# Patient Record
Sex: MALE | Race: WHITE | NOT HISPANIC OR LATINO
[De-identification: names, ages, dates, MRNs, and addresses within clinical notes are randomized per-mention and may not be internally consistent; named-entity substitution may affect disease eponyms.]

---

## 2017-01-26 ENCOUNTER — PRIOR ORIGINAL RECORDS (OUTPATIENT)
Dept: OTHER | Age: 62
End: 2017-01-26

## 2017-02-27 ENCOUNTER — OFFICE VISIT (OUTPATIENT)
Dept: FAMILY MEDICINE CLINIC | Facility: CLINIC | Age: 62
End: 2017-02-27

## 2017-02-27 VITALS
BODY MASS INDEX: 44.1 KG/M2 | OXYGEN SATURATION: 97 % | DIASTOLIC BLOOD PRESSURE: 90 MMHG | TEMPERATURE: 99 F | WEIGHT: 315 LBS | SYSTOLIC BLOOD PRESSURE: 138 MMHG | HEIGHT: 71 IN | HEART RATE: 82 BPM | RESPIRATION RATE: 24 BRPM

## 2017-02-27 DIAGNOSIS — J40 BRONCHITIS: Primary | ICD-10-CM

## 2017-02-27 PROCEDURE — 99213 OFFICE O/P EST LOW 20 MIN: CPT | Performed by: NURSE PRACTITIONER

## 2017-02-27 RX ORDER — PREDNISONE 20 MG/1
20 TABLET ORAL 2 TIMES DAILY
Qty: 10 TABLET | Refills: 0 | Status: ON HOLD | OUTPATIENT
Start: 2017-02-27 | End: 2017-03-02

## 2017-02-27 RX ORDER — ALBUTEROL SULFATE 2.5 MG/3ML
2.5 SOLUTION RESPIRATORY (INHALATION) EVERY 6 HOURS PRN
Qty: 1 BOX | Refills: 0 | Status: SHIPPED | OUTPATIENT
Start: 2017-02-27

## 2017-02-27 RX ORDER — LEVOTHYROXINE SODIUM 0.12 MG/1
125 TABLET ORAL
Refills: 3 | COMMUNITY
Start: 2016-12-25

## 2017-02-27 NOTE — PROGRESS NOTES
CHIEF COMPLAINT:   Patient presents with:  Cough: wet, green s/s for 5 days, last Neb at [facility]      HPI:   Camden Sarmiento is a 57year old male who presents for upper respiratory symptoms for  5 days. Patient reports cough with light green colored sputum.  Sy REVIEW OF SYSTEMS:   GENERAL: normal appetite  SKIN: no rashes or abnormal skin lesions  HEENT: See HPI  LUNGS: denies shortness but + wheezing, See HPI  CARDIOVASCULAR: denies chest pain or palpitations   GI: denies N/V/C or abdominal pain  NEURO: Denies albuterol sulfate (2.5 MG/3ML) 0.083% Inhalation Nebu Soln 1 Box 0      Sig: Take 3 mL (2.5 mg total) by nebulization every 6 (six) hours as needed for Wheezing. Risks, benefits, and side effects of medication explained and discussed.     Patient · A sputum test for bacteria. This requires a sample of mucus from the lungs. · A nasal or throat swab for bacterial infection. · A chest X-ray if your healthcare provider thinks you have pneumonia.   · Tests to check for an underlying condition, such as · Avoid tobacco smoke. If you smoke, quit. Stay away from smoky places. Ask friends and family not to smoke around you, or in your home or car. · Get checked for allergies.   · Ask your provider about getting a yearly flu shot, and pneumococcal or pneumoni

## 2017-02-27 NOTE — PATIENT INSTRUCTIONS
What Is Acute Bronchitis? Acute or short-term bronchitis last for days or weeks. It occurs when the bronchial tubes (airways in the lungs) are irritated by a virus, bacteria, or allergen. This causes a cough that produces yellow or greenish mucus.   Insi · Tests to check for an underlying condition, such as allergies, asthma, or COPD. You may need to see a specialist for more lung function testing. Treating a cough  The main treatment for bronchitis is easing symptoms.  Avoiding smoke, allergens, and other · Wash your hands often. This helps reduce the chance of picking up viruses that cause colds and flu. Call your healthcare provider if:  · Symptoms worsen, or new symptoms develop. · Breathing problems worsen or  become severe.   · Symptoms don’t get bett

## 2017-03-01 ENCOUNTER — APPOINTMENT (OUTPATIENT)
Dept: GENERAL RADIOLOGY | Age: 62
DRG: 194 | End: 2017-03-01
Attending: EMERGENCY MEDICINE
Payer: COMMERCIAL

## 2017-03-01 ENCOUNTER — HOSPITAL ENCOUNTER (INPATIENT)
Facility: HOSPITAL | Age: 62
LOS: 1 days | Discharge: HOME | DRG: 194 | End: 2017-03-02
Attending: EMERGENCY MEDICINE | Admitting: HOSPITALIST
Payer: COMMERCIAL

## 2017-03-01 DIAGNOSIS — J18.9 COMMUNITY ACQUIRED PNEUMONIA: Primary | ICD-10-CM

## 2017-03-01 DIAGNOSIS — J40 BRONCHITIS: ICD-10-CM

## 2017-03-01 DIAGNOSIS — R09.02 HYPOXIA: ICD-10-CM

## 2017-03-01 DIAGNOSIS — R73.9 HYPERGLYCEMIA: ICD-10-CM

## 2017-03-01 DIAGNOSIS — J45.901 ASTHMA EXACERBATION: ICD-10-CM

## 2017-03-01 LAB
ALBUMIN SERPL-MCNC: 3.9 G/DL (ref 3.5–4.8)
ALP LIVER SERPL-CCNC: 86 U/L (ref 45–117)
ALT SERPL-CCNC: 93 U/L (ref 17–63)
APTT PPP: 29.7 SECONDS (ref 25–34)
AST SERPL-CCNC: 65 U/L (ref 15–41)
BASOPHILS # BLD AUTO: 0.02 X10(3) UL (ref 0–0.1)
BASOPHILS NFR BLD AUTO: 0.3 %
BILIRUB SERPL-MCNC: 0.5 MG/DL (ref 0.1–2)
BUN BLD-MCNC: 12 MG/DL (ref 8–20)
CALCIUM BLD-MCNC: 8.4 MG/DL (ref 8.3–10.3)
CHLORIDE: 103 MMOL/L (ref 101–111)
CO2: 27 MMOL/L (ref 22–32)
CREAT BLD-MCNC: 1.08 MG/DL (ref 0.7–1.3)
D-DIMER: 0.31 UG/ML FEU (ref 0–0.49)
EOSINOPHIL # BLD AUTO: 0.01 X10(3) UL (ref 0–0.3)
EOSINOPHIL NFR BLD AUTO: 0.1 %
ERYTHROCYTE [DISTWIDTH] IN BLOOD BY AUTOMATED COUNT: 12.9 % (ref 11.5–16)
GLUCOSE BLD-MCNC: 204 MG/DL (ref 70–99)
HCT VFR BLD AUTO: 41 % (ref 37–53)
HGB BLD-MCNC: 14.7 G/DL (ref 13–17)
IMMATURE GRANULOCYTE COUNT: 0.07 X10(3) UL (ref 0–1)
IMMATURE GRANULOCYTE RATIO %: 1 %
INR BLD: 1.02 (ref 0.89–1.12)
LYMPHOCYTES # BLD AUTO: 0.95 X10(3) UL (ref 0.9–4)
LYMPHOCYTES NFR BLD AUTO: 13 %
M PROTEIN MFR SERPL ELPH: 7.5 G/DL (ref 6.1–8.3)
MCH RBC QN AUTO: 35.1 PG (ref 27–33.2)
MCHC RBC AUTO-ENTMCNC: 35.9 G/DL (ref 31–37)
MCV RBC AUTO: 97.9 FL (ref 80–99)
MONOCYTES # BLD AUTO: 0.25 X10(3) UL (ref 0.1–0.6)
MONOCYTES NFR BLD AUTO: 3.4 %
NEUTROPHIL ABS PRELIM: 6.03 X10 (3) UL (ref 1.3–6.7)
NEUTROPHILS # BLD AUTO: 6.03 X10(3) UL (ref 1.3–6.7)
NEUTROPHILS NFR BLD AUTO: 82.2 %
PLATELET # BLD AUTO: 138 10(3)UL (ref 150–450)
POTASSIUM SERPL-SCNC: 3.6 MMOL/L (ref 3.6–5.1)
PSA SERPL DL<=0.01 NG/ML-MCNC: 13.1 SECONDS (ref 11.8–14.1)
RBC # BLD AUTO: 4.19 X10(6)UL (ref 4.3–5.7)
RED CELL DISTRIBUTION WIDTH-SD: 46.4 FL (ref 35.1–46.3)
SODIUM SERPL-SCNC: 139 MMOL/L (ref 136–144)
TROPONIN: <0.046 NG/ML (ref ?–0.05)
WBC # BLD AUTO: 7.3 X10(3) UL (ref 4–13)

## 2017-03-01 PROCEDURE — 99223 1ST HOSP IP/OBS HIGH 75: CPT | Performed by: INTERNAL MEDICINE

## 2017-03-01 PROCEDURE — 71010 XR CHEST AP PORTABLE  (CPT=71010): CPT

## 2017-03-01 RX ORDER — IPRATROPIUM BROMIDE AND ALBUTEROL SULFATE 2.5; .5 MG/3ML; MG/3ML
3 SOLUTION RESPIRATORY (INHALATION) EVERY 4 HOURS PRN
Status: DISCONTINUED | OUTPATIENT
Start: 2017-03-01 | End: 2017-03-02

## 2017-03-01 RX ORDER — CARVEDILOL 12.5 MG/1
12.5 TABLET ORAL 2 TIMES DAILY WITH MEALS
Status: DISCONTINUED | OUTPATIENT
Start: 2017-03-02 | End: 2017-03-01

## 2017-03-01 RX ORDER — GUAIFENESIN 600 MG
1200 TABLET, EXTENDED RELEASE 12 HR ORAL 2 TIMES DAILY
Status: DISCONTINUED | OUTPATIENT
Start: 2017-03-01 | End: 2017-03-02

## 2017-03-01 RX ORDER — LISINOPRIL 40 MG/1
40 TABLET ORAL DAILY
Status: DISCONTINUED | OUTPATIENT
Start: 2017-03-02 | End: 2017-03-01

## 2017-03-01 RX ORDER — ACETAMINOPHEN 325 MG/1
650 TABLET ORAL EVERY 6 HOURS PRN
Status: DISCONTINUED | OUTPATIENT
Start: 2017-03-01 | End: 2017-03-02

## 2017-03-01 RX ORDER — GUAIFENESIN 600 MG
1200 TABLET, EXTENDED RELEASE 12 HR ORAL 2 TIMES DAILY
COMMUNITY
End: 2018-02-06

## 2017-03-01 RX ORDER — HEPARIN SODIUM 5000 [USP'U]/ML
7500 INJECTION, SOLUTION INTRAVENOUS; SUBCUTANEOUS EVERY 8 HOURS
Status: DISCONTINUED | OUTPATIENT
Start: 2017-03-01 | End: 2017-03-02

## 2017-03-01 RX ORDER — ONDANSETRON 2 MG/ML
4 INJECTION INTRAMUSCULAR; INTRAVENOUS EVERY 6 HOURS PRN
Status: DISCONTINUED | OUTPATIENT
Start: 2017-03-01 | End: 2017-03-02

## 2017-03-01 RX ORDER — TRANDOLAPRIL 4 MG/1
4 TABLET ORAL DAILY
Status: DISCONTINUED | OUTPATIENT
Start: 2017-03-02 | End: 2017-03-02

## 2017-03-01 RX ORDER — VERAPAMIL HYDROCHLORIDE 240 MG/1
240 TABLET, FILM COATED, EXTENDED RELEASE ORAL DAILY
Status: DISCONTINUED | OUTPATIENT
Start: 2017-03-02 | End: 2017-03-02

## 2017-03-01 RX ORDER — CARVEDILOL PHOSPHATE 40 MG/1
40 CAPSULE, EXTENDED RELEASE ORAL DAILY
Status: DISCONTINUED | OUTPATIENT
Start: 2017-03-02 | End: 2017-03-02

## 2017-03-01 RX ORDER — METHYLPREDNISOLONE SODIUM SUCCINATE 125 MG/2ML
125 INJECTION, POWDER, LYOPHILIZED, FOR SOLUTION INTRAMUSCULAR; INTRAVENOUS ONCE
Status: COMPLETED | OUTPATIENT
Start: 2017-03-01 | End: 2017-03-01

## 2017-03-01 RX ORDER — ALLOPURINOL 300 MG/1
300 TABLET ORAL DAILY
Status: DISCONTINUED | OUTPATIENT
Start: 2017-03-02 | End: 2017-03-02

## 2017-03-01 RX ORDER — METHYLPREDNISOLONE SODIUM SUCCINATE 125 MG/2ML
125 INJECTION, POWDER, LYOPHILIZED, FOR SOLUTION INTRAMUSCULAR; INTRAVENOUS EVERY 8 HOURS
Status: DISCONTINUED | OUTPATIENT
Start: 2017-03-02 | End: 2017-03-02

## 2017-03-01 RX ORDER — LEVOTHYROXINE SODIUM 0.12 MG/1
125 TABLET ORAL
Status: DISCONTINUED | OUTPATIENT
Start: 2017-03-02 | End: 2017-03-02

## 2017-03-01 NOTE — ED INITIAL ASSESSMENT (HPI)
Pt has had sob and went to immed care on Monday dx bronchitis and now worsening sob, returned from [facility] 4 weeks ago.

## 2017-03-01 NOTE — ED PROVIDER NOTES
Patient Seen in: [facility] Emergency Department In [place]    History   Patient presents with:  Dyspnea ALEAH SOB (respiratory)    Stated Complaint: Shortness of breath, was at [facility] immediate care center with dx of bronchitis,*    HPI    Patient has a hi ergocalciferol 89493 UNITS Oral Cap,     Ascorbic Acid (VITAMIN C) 100 MG Oral Tab,  Take 100 mg by mouth daily. Cyanocobalamin (B-12) 100 MCG Oral Tab,  Take by mouth. Multiple Vitamins-Minerals (DAILY MULTI) Oral Tab,  Take by mouth.    Albuterol Sulf obese but nondistended. Completely nontender  Extremities: No extraordinary edema. Lower extremities with some chronic venous stasis changes. Skin: Psoriatic changes  Neurologic:  Mental status as above.   Patient moves all extremities with good strength Patient is having significant wheeze with significant hypoxia. Likely, he has a flareup of his asthma with superimposed bronchitis.   Pneumonia including the differential.  With his obesity and recent immobilization, pulmonary embolism must be excluded with the patient, family, and clinical staff.

## 2017-03-02 VITALS
HEART RATE: 75 BPM | BODY MASS INDEX: 44.1 KG/M2 | WEIGHT: 315 LBS | SYSTOLIC BLOOD PRESSURE: 136 MMHG | HEIGHT: 71 IN | TEMPERATURE: 98 F | DIASTOLIC BLOOD PRESSURE: 70 MMHG | RESPIRATION RATE: 20 BRPM | OXYGEN SATURATION: 98 %

## 2017-03-02 LAB
ADENOVIRUS PCR:: NEGATIVE
ALBUMIN SERPL-MCNC: 3.5 G/DL (ref 3.5–4.8)
ALP LIVER SERPL-CCNC: 81 U/L (ref 45–117)
ALT SERPL-CCNC: 80 U/L (ref 17–63)
AST SERPL-CCNC: 53 U/L (ref 15–41)
ATRIAL RATE: 85 BPM
B PERT DNA SPEC QL NAA+PROBE: NEGATIVE
BASOPHILS # BLD AUTO: 0.01 X10(3) UL (ref 0–0.1)
BASOPHILS NFR BLD AUTO: 0.2 %
BILIRUB SERPL-MCNC: 0.4 MG/DL (ref 0.1–2)
BUN BLD-MCNC: 13 MG/DL (ref 8–20)
C PNEUM DNA SPEC QL NAA+PROBE: NEGATIVE
CALCIUM BLD-MCNC: 8.9 MG/DL (ref 8.3–10.3)
CHLORIDE: 105 MMOL/L (ref 101–111)
CO2: 27 MMOL/L (ref 22–32)
CORONAVIRUS 229E PCR:: NEGATIVE
CORONAVIRUS HKU1 PCR:: NEGATIVE
CORONAVIRUS NL63 PCR:: NEGATIVE
CORONAVIRUS OC43 PCR:: NEGATIVE
CREAT BLD-MCNC: 0.92 MG/DL (ref 0.7–1.3)
EOSINOPHIL # BLD AUTO: 0 X10(3) UL (ref 0–0.3)
EOSINOPHIL NFR BLD AUTO: 0 %
ERYTHROCYTE [DISTWIDTH] IN BLOOD BY AUTOMATED COUNT: 12.8 % (ref 11.5–16)
FLUAV H1 2009 PAND RNA SPEC QL NAA+PROBE: NEGATIVE
FLUAV H1 RNA SPEC QL NAA+PROBE: NEGATIVE
FLUAV H3 RNA SPEC QL NAA+PROBE: NEGATIVE
FLUAV RNA SPEC QL NAA+PROBE: NEGATIVE
FLUBV RNA SPEC QL NAA+PROBE: NEGATIVE
GLUCOSE BLD-MCNC: 165 MG/DL (ref 70–99)
HCT VFR BLD AUTO: 40.3 % (ref 37–53)
HGB BLD-MCNC: 14.2 G/DL (ref 13–17)
IMMATURE GRANULOCYTE COUNT: 0.04 X10(3) UL (ref 0–1)
IMMATURE GRANULOCYTE RATIO %: 0.8 %
LYMPHOCYTES # BLD AUTO: 0.97 X10(3) UL (ref 0.9–4)
LYMPHOCYTES NFR BLD AUTO: 18.7 %
M PROTEIN MFR SERPL ELPH: 7.2 G/DL (ref 6.1–8.3)
MCH RBC QN AUTO: 34.4 PG (ref 27–33.2)
MCHC RBC AUTO-ENTMCNC: 35.2 G/DL (ref 31–37)
MCV RBC AUTO: 97.6 FL (ref 80–99)
METAPNEUMOVIRUS PCR:: POSITIVE
MONOCYTES # BLD AUTO: 0.07 X10(3) UL (ref 0.1–0.6)
MONOCYTES NFR BLD AUTO: 1.3 %
MYCOPLASMA PNEUMONIA PCR:: NEGATIVE
NEUTROPHIL ABS PRELIM: 4.11 X10 (3) UL (ref 1.3–6.7)
NEUTROPHILS # BLD AUTO: 4.11 X10(3) UL (ref 1.3–6.7)
NEUTROPHILS NFR BLD AUTO: 79 %
P AXIS: 56 DEGREES
P-R INTERVAL: 172 MS
PARAINFLUENZA 1 PCR:: NEGATIVE
PARAINFLUENZA 2 PCR:: NEGATIVE
PARAINFLUENZA 3 PCR:: NEGATIVE
PARAINFLUENZA 4 PCR:: NEGATIVE
PLATELET # BLD AUTO: 134 10(3)UL (ref 150–450)
POTASSIUM SERPL-SCNC: 4 MMOL/L (ref 3.6–5.1)
Q-T INTERVAL: 372 MS
QRS DURATION: 86 MS
QTC CALCULATION (BEZET): 442 MS
R AXIS: -32 DEGREES
RBC # BLD AUTO: 4.13 X10(6)UL (ref 4.3–5.7)
RED CELL DISTRIBUTION WIDTH-SD: 45.1 FL (ref 35.1–46.3)
RHINOVIRUS/ENTERO PCR:: NEGATIVE
RSV RNA SPEC QL NAA+PROBE: NEGATIVE
SODIUM SERPL-SCNC: 140 MMOL/L (ref 136–144)
T AXIS: 54 DEGREES
VENTRICULAR RATE: 85 BPM
WBC # BLD AUTO: 5.2 X10(3) UL (ref 4–13)

## 2017-03-02 PROCEDURE — 99239 HOSP IP/OBS DSCHRG MGMT >30: CPT | Performed by: INTERNAL MEDICINE

## 2017-03-02 RX ORDER — ALBUTEROL SULFATE 2.5 MG/3ML
2.5 SOLUTION RESPIRATORY (INHALATION)
Status: DISCONTINUED | OUTPATIENT
Start: 2017-03-02 | End: 2017-03-02

## 2017-03-02 RX ORDER — PREDNISONE 20 MG/1
40 TABLET ORAL
Qty: 10 TABLET | Refills: 0 | Status: SHIPPED | OUTPATIENT
Start: 2017-03-02 | End: 2017-03-07

## 2017-03-02 RX ORDER — PREDNISONE 20 MG/1
40 TABLET ORAL
Status: DISCONTINUED | OUTPATIENT
Start: 2017-03-02 | End: 2017-03-02

## 2017-03-02 RX ORDER — MOMETASONE 50 UG/1
1 SPRAY, METERED NASAL DAILY
Qty: 1 BOTTLE | Refills: 0 | Status: SHIPPED | OUTPATIENT
Start: 2017-03-02 | End: 2017-03-09

## 2017-03-02 NOTE — PROGRESS NOTES
[facility] Progress Note:  Anticoagulation Weight Dose Adjustment for heparin    Brisa Solis is a 57year old male who has been prescribed heparin 5000 units subcutaneously every 8 hours for VTE prophylaxis.       Estimated Creatinine Clearance: 75.5

## 2017-03-02 NOTE — PROGRESS NOTES
NURSING ADMISSION NOTE      Patient admitted via Cart  Oriented to room. Safety precautions initiated. Bed in low position.   Call light in reach.    ---------------------------------------------------------  Admission navigator completed  Patient is

## 2017-03-02 NOTE — PAYOR COMM NOTE
Attending Physician: Oracio Tamez MD    3/1    ED LATE    Dyspnea ALEAH SOB    Shortness of breath, was at [facility] with dx of bronchitis,*    +  history of asthma.  Last week  felt like his asthma was flaring.  His breathing was ba WORSE    ALB NEBS Q6  ROCEPHIN IV QD  ATROVENT NEBS  SOLUMEDROL IV Q8  POX  TELE  02  K REPLACED

## 2017-03-02 NOTE — H&P
[facility] HOSPITALIST  History and Physical     Debbie Jarred Patient Status:  Inpatient    1955 MRN [id number]   [facility] 3NE-A Attending Ritesh Whittington MD   Hosp Day # 0 PCP Ulises Julien MD     Chief Complaint: SOB    History of Rfl: 0   Trandolapril 4 MG Oral Tab Take 4 mg by mouth once daily. Disp:  Rfl: 2   Verapamil HCl  MG Oral Capsule SR 24 Hr Take by mouth once daily. Disp:  Rfl: 1   allopurinol 300 MG Oral Tab Take 300 mg by mouth daily.  Disp:  Rfl:    ergocalciferol 03/01/17   1751   GLU  204*   BUN  12   CREATSERUM  1.08   CA  8.4   ALB  3.9   NA  139   K  3.6   CL  103   CO2  27.0   ALKPHO  86   AST  65*   ALT  93*   BILT  0.5   TP  7.5       Estimated Creatinine Clearance: 75.5 mL/min (based on Cr of 1.08).     Rece

## 2017-03-02 NOTE — PLAN OF CARE
Pt alert and oriented. Denies pain, nausea. IV abx dc'd per hospitalist. Pt started on PO prednisone. VSS. O2 sats 95% on RA. Afebrile. Pt ambulated in hallway, tolerated well. No c/o SOB. Pt stated he was 90% at his baseline.  Pt stated he was able to walk

## 2017-03-03 NOTE — PLAN OF CARE
NURSING DISCHARGE NOTE    Discharged home via wheelchair. Accompanied by support staff. Belongings sent home with pt. Discharge instructions, f/u appointment discussed with pt. Pt informed about meds that were called into pharmacy.  Home meds (5) r

## 2017-03-03 NOTE — DISCHARGE SUMMARY
[facility] [place] HOSPITALIST  DISCHARGE SUMMARY     Janelle Gaitan Patient Status:  Inpatient    1955 MRN [id number]   [place] 3NE-A Attending Gilmer Martinez MD   [facility] Day # 1 PCP Blake Aldrich MD     Date of Admission: 3/1/2017  Date pneumonia so he was initially started on antibiotics but later his respiratory viral panel returned positive for metapneumovirus so antibiotics were discontinued. He was mildly hypoxic on admission that resolved with scheduled neb treatments.  He improved f on:  3/7/2017   Quantity:  10 tablet   Refills:  0         CONTINUE taking these medications       Instructions Prescription details    allopurinol 300 MG Tabs   Last time this was given:  300 mg on 3/2/2017  8:52 AM   Commonly known as:  Hillary RIZZO 3/10/2017  Follow up with Dr. Antonette Roldan on 3/10 at 1:30      Vital signs:  Temp:  [97.8 °F (36.6 °C)-98.6 °F (37 °C)] 98.2 °F (36.8 °C)  Pulse:  [62-79] 75  Resp:  [16-20] 20  BP: (136-175)/(70-89) 136/70 mmHg    Physical Exam:    General: No acute distress.

## 2017-05-25 ENCOUNTER — PRIOR ORIGINAL RECORDS (OUTPATIENT)
Dept: OTHER | Age: 62
End: 2017-05-25

## 2017-09-20 ENCOUNTER — PRIOR ORIGINAL RECORDS (OUTPATIENT)
Dept: OTHER | Age: 62
End: 2017-09-20

## 2018-01-24 ENCOUNTER — PRIOR ORIGINAL RECORDS (OUTPATIENT)
Dept: OTHER | Age: 63
End: 2018-01-24

## 2018-02-26 ENCOUNTER — ANESTHESIA EVENT (OUTPATIENT)
Dept: ENDOSCOPY | Facility: HOSPITAL | Age: 63
End: 2018-02-26
Payer: COMMERCIAL

## 2018-02-27 ENCOUNTER — HOSPITAL ENCOUNTER (OUTPATIENT)
Facility: HOSPITAL | Age: 63
Setting detail: HOSPITAL OUTPATIENT SURGERY
Discharge: HOME | End: 2018-02-27
Attending: SURGERY | Admitting: SURGERY
Payer: COMMERCIAL

## 2018-02-27 ENCOUNTER — ANESTHESIA (OUTPATIENT)
Dept: ENDOSCOPY | Facility: HOSPITAL | Age: 63
End: 2018-02-27
Payer: COMMERCIAL

## 2018-02-27 ENCOUNTER — SURGERY (OUTPATIENT)
Age: 63
End: 2018-02-27

## 2018-02-27 VITALS
TEMPERATURE: 99 F | RESPIRATION RATE: 16 BRPM | BODY MASS INDEX: 44.1 KG/M2 | WEIGHT: 315 LBS | HEIGHT: 71 IN | DIASTOLIC BLOOD PRESSURE: 83 MMHG | HEART RATE: 74 BPM | SYSTOLIC BLOOD PRESSURE: 152 MMHG | OXYGEN SATURATION: 94 %

## 2018-02-27 DIAGNOSIS — Z86.010 HISTORY OF COLON POLYPS: ICD-10-CM

## 2018-02-27 PROCEDURE — 0DJD8ZZ INSPECTION OF LOWER INTESTINAL TRACT, VIA NATURAL OR ARTIFICIAL OPENING ENDOSCOPIC: ICD-10-PCS | Performed by: SURGERY

## 2018-02-27 RX ORDER — MORPHINE SULFATE 2 MG/ML
2 INJECTION, SOLUTION INTRAMUSCULAR; INTRAVENOUS EVERY 5 MIN PRN
Status: DISCONTINUED | OUTPATIENT
Start: 2018-02-27 | End: 2018-02-27

## 2018-02-27 RX ORDER — SODIUM CHLORIDE, SODIUM LACTATE, POTASSIUM CHLORIDE, CALCIUM CHLORIDE 600; 310; 30; 20 MG/100ML; MG/100ML; MG/100ML; MG/100ML
INJECTION, SOLUTION INTRAVENOUS CONTINUOUS
Status: DISCONTINUED | OUTPATIENT
Start: 2018-02-27 | End: 2018-02-27

## 2018-02-27 RX ORDER — ONDANSETRON 2 MG/ML
4 INJECTION INTRAMUSCULAR; INTRAVENOUS AS NEEDED
Status: DISCONTINUED | OUTPATIENT
Start: 2018-02-27 | End: 2018-02-27

## 2018-02-27 RX ORDER — NALOXONE HYDROCHLORIDE 0.4 MG/ML
80 INJECTION, SOLUTION INTRAMUSCULAR; INTRAVENOUS; SUBCUTANEOUS AS NEEDED
Status: DISCONTINUED | OUTPATIENT
Start: 2018-02-27 | End: 2018-02-27

## 2018-02-27 NOTE — INTERVAL H&P NOTE
Pre-op Diagnosis: History of colon polyps [Z86.010]    The above referenced H&P was reviewed by Kasandra Saunders MD on 2/27/2018, the patient was examined and no significant changes have occurred in the patient's condition since the H&P was performed.   I discus

## 2018-02-27 NOTE — ANESTHESIA PREPROCEDURE EVALUATION
PRE-OP EVALUATION    Patient Name: Quita Sharif    Pre-op Diagnosis: History of colon polyps [Z86.010]    Procedure(s):  COLONOSCOPY, POSSIBLE BIOPSY, POSSIBLE POLYPECTOMY    Surgeon(s) and Role:     Mirna Gomez MD - Primary    Pre-op vitals reviewed date: SINUS SURGERY    No date: TONSILLECTOMY     Smoking status: Former Smoker  For 4.00 Years     Quit date: 2/13/1988    Smokeless tobacco: Never Used    Alcohol use Yes  3.6 oz/week    6 Cans of beer per week            Drug use: No     Available pre-o

## 2018-02-27 NOTE — ANESTHESIA POSTPROCEDURE EVALUATION
[address] Patient Status:  Hospital Outpatient Surgery   Age/Gender 60year old male MRN [id number]   Location [facility]. Attending Zhao Tadeo MD   Hosp Day # 0 PCP Eula Eisenberg MD       Anesthesia Post-op Note

## 2018-02-27 NOTE — OPERATIVE REPORT
[facility]    PATIENT'S NAME: Vitaly Stackocent   ATTENDING PHYSICIAN: Maddy Roque M.D. OPERATING PHYSICIAN: Maddy Roque M.D.    PATIENT ACCOUNT#:   [de-identified]    LOCATION:  ENDO  ENDO POOL ROOMS 8 EDWP  MEDICAL RECORD #:   [id number]       DATE OF BI

## 2018-02-27 NOTE — H&P (VIEW-ONLY)
2/6/2018    Patient presents with:  New Patient: Colonoscopy Ref: Self      HPI:    Nori Guzman is a 60year old male who presents today for colonoscopy evaluation. Patient denies any abdominal pain. no change in bowel movements. no rectal bleeding.  no (VITAMIN C) 100 MG Oral Tab Take 100 mg by mouth daily. Disp:  Rfl:    Cyanocobalamin (B-12) 100 MCG Oral Tab Take by mouth once a week. Disp:  Rfl:        No Known Allergies    History reviewed. No pertinent family history.     Smoking status: Never Smok

## 2018-02-27 NOTE — BRIEF OP NOTE
Pre-Operative Diagnosis: History of colon polyps [Z86.010]     Post-Operative Diagnosis: diverticulosis, hemorrhoids     Procedure Performed:   Procedure(s):  COLONOSCOPY, POSSIBLE BIOPSY, POSSIBLE POLYPECTOMY    Surgeon(s) and Role:     Mirna Gomez

## 2018-05-23 ENCOUNTER — PRIOR ORIGINAL RECORDS (OUTPATIENT)
Dept: OTHER | Age: 63
End: 2018-05-23

## 2018-09-27 ENCOUNTER — PRIOR ORIGINAL RECORDS (OUTPATIENT)
Dept: OTHER | Age: 63
End: 2018-09-27

## 2018-12-31 ENCOUNTER — PRIOR ORIGINAL RECORDS (OUTPATIENT)
Dept: OTHER | Age: 63
End: 2018-12-31

## 2019-01-24 ENCOUNTER — PRIOR ORIGINAL RECORDS (OUTPATIENT)
Dept: OTHER | Age: 64
End: 2019-01-24

## 2019-05-29 ENCOUNTER — PRIOR ORIGINAL RECORDS (OUTPATIENT)
Dept: OTHER | Age: 64
End: 2019-05-29

## 2019-12-31 ENCOUNTER — PRIOR ORIGINAL RECORDS (OUTPATIENT)
Dept: OTHER | Age: 64
End: 2019-12-31

## 2020-10-09 LAB
ALBUMIN/GLOB SERPL: 1.9 (CALC) (ref 1–2.5)
ALBUMIN/GLOB SERPL: 1.9 (CALC) (ref 1–2.5)
ALBUMIN/GLOB SERPL: 2 (CALC)
ALBUMIN: 4.3 G/DL
ALBUMIN: 4.5 G/DL (ref 3.6–5.1)
ALBUMIN: 4.6 G/DL (ref 3.6–5.1)
ALKALINE PHOSPHATASE: 82 UNIT/L
ALKALINE PHOSPHATASE: 82 UNIT/L (ref 40–115)
ALKALINE PHOSPHATASE: 94 UNIT/L (ref 40–115)
ALT: 20 UNIT/L
ALT: 20 UNIT/L (ref 9–46)
ALT: 20 UNIT/L (ref 9–46)
AST: 12 UNIT/L (ref 10–35)
AST: 19 UNIT/L
AST: 20 UNIT/L (ref 10–35)
BASO%: 0.1 %
BASO%: 0.2 %
BASO%: 0.3 %
BASO%: 0.4 %
BASO: 0 10^3/UL
BILIRUBIN, TOTAL: 0.2 MG/DL
BILIRUBIN, TOTAL: 0.5 MG/DL (ref 0.2–1.2)
BILIRUBIN, TOTAL: 0.6 MG/DL (ref 0.2–1.2)
BUN/CREATININE RATIO: 12 (CALC)
BUN/CREATININE RATIO: ABNORMAL (CALC) (ref 6–22)
BUN/CREATININE RATIO: NORMAL (CALC) (ref 6–22)
CALCIUM: 8.9 MG/DL
CALCIUM: 9.3 MG/DL (ref 8.6–10.3)
CALCIUM: 9.6 MG/DL (ref 8.6–10.3)
CARBON DIOXIDE: 21 MMOL/L
CARBON DIOXIDE: 24 MMOL/L (ref 20–32)
CARBON DIOXIDE: 26 MMOL/L (ref 20–32)
CHLORIDE: 102 MMOL/L (ref 98–110)
CHLORIDE: 103 MMOL/L (ref 98–110)
CHLORIDE: 104 MMOL/L
CRCL (C&G) (MOSAIQ HL): 171.53 ML/MIN
CRCL (C&G) (MOSAIQ HL): 195.25 ML/MIN
CRCL (C&G) (MOSAIQ HL): 209.88 ML/MIN
CREATININE CLEARANCE (MOSAIQ HL): 69 ML/MIN
CREATININE CLEARANCE (MOSAIQ HL): 81.6 ML/MIN
CREATININE CLEARANCE (MOSAIQ HL): 86 ML/MIN
CREATININE: 0.73 MG/DL
CREATININE: 0.77 MG/DL (ref 0.7–1.25)
CREATININE: 0.91 MG/DL (ref 0.7–1.25)
EGFR AFRICAN AMERICAN: 103 ML/MIN/1.73M2
EGFR AFRICAN AMERICAN: 111 ML/MIN/1.73M2
EGFR AFRICAN AMERICAN: 113 ML/MIN/1.73M2
EGFR NON-AFR. AMERICAN: 89 ML/MIN/1.73M2
EGFR NON-AFR. AMERICAN: 96 ML/MIN/1.73M2
EGFR NON-AFR. AMERICAN: 98 ML/MIN/1.73M2
EOS%: 1.8 %
EOS%: 2 %
EOS%: 2.2 %
EOS%: 2.2 %
EOS%: 2.4 %
EOS%: 2.5 %
EOS%: 2.6 %
EOS%: 2.6 %
EOS: 0.2 10^3/UL
EOS: 0.3 10^3/UL
GLOBULIN: 2.2 G/DL (CALC)
GLOBULIN: 2.4 G/DL (CALC) (ref 1.9–3.7)
GLOBULIN: 2.4 G/DL (CALC) (ref 1.9–3.7)
GLUCOSE: 107 MG/DL (ref 65–99)
GLUCOSE: 117 MG/DL
GLUCOSE: 84 MG/DL (ref 65–99)
HCT: 39.9 % (ref 38–54)
HCT: 39.9 % (ref 38–54)
HCT: 40.6 % (ref 38–54)
HCT: 40.7 % (ref 38–54)
HCT: 40.8 % (ref 38–54)
HCT: 41.1 % (ref 38–54)
HCT: 42.6 % (ref 38–54)
HCT: 43.1 % (ref 38–54)
HGB: 14 G/DL (ref 12–18)
HGB: 14 G/DL (ref 12–18)
HGB: 14.4 G/DL (ref 12–18)
HGB: 14.5 G/DL (ref 12–18)
HGB: 14.6 G/DL (ref 12–18)
HGB: 14.9 G/DL (ref 12–18)
LD: 148 UNIT/L (ref 120–250)
LD: 178 UNIT/L (ref 120–250)
LYMPH%: 18.4 % (ref 12–44)
LYMPH%: 19.7 % (ref 12–44)
LYMPH%: 20.5 % (ref 12–44)
LYMPH%: 22.5 % (ref 12–44)
LYMPH%: 23.4 % (ref 12–44)
LYMPH%: 23.9 % (ref 12–44)
LYMPH%: 24 % (ref 12–44)
LYMPH%: 24 % (ref 12–44)
LYMPH: 1.5 10^3/UL (ref 0.8–2.8)
LYMPH: 1.8 10^3/UL (ref 0.8–2.8)
LYMPH: 1.9 10^3/UL (ref 0.8–2.8)
LYMPH: 2 10^3/UL (ref 0.8–2.8)
LYMPH: 2.1 10^3/UL (ref 0.8–2.8)
LYMPH: 2.2 10^3/UL (ref 0.8–2.8)
MCH: 33.5 PG (ref 26–33)
MCH: 34.1 PG (ref 26–33)
MCH: 34.1 PG (ref 26–33)
MCH: 34.3 PG (ref 26–33)
MCH: 34.4 PG (ref 26–33)
MCH: 34.4 PG (ref 26–33)
MCH: 34.6 PG (ref 26–33)
MCH: 34.7 PG (ref 26–33)
MCHC: 34.3 G/DL (ref 31–36)
MCHC: 34.4 G/DL (ref 31–36)
MCHC: 34.6 G/DL (ref 31–36)
MCHC: 35.1 G/DL (ref 31–36)
MCHC: 35.3 G/DL (ref 31–36)
MCHC: 35.3 G/DL (ref 31–36)
MCHC: 35.5 G/DL (ref 31–36)
MCHC: 36.1 G/DL (ref 31–36)
MCV: 96.1 FML (ref 82–100)
MCV: 96.9 FML (ref 82–100)
MCV: 97.6 FML (ref 82–100)
MCV: 97.7 FML (ref 82–100)
MCV: 97.8 FML (ref 82–100)
MCV: 98.1 FML (ref 82–100)
MCV: 98.6 FML (ref 82–100)
MCV: 99 FML (ref 82–100)
MONO%: 6.8 % (ref 2–12)
MONO%: 7.4 % (ref 2–12)
MONO%: 7.8 % (ref 2–12)
MONO%: 8 % (ref 2–12)
MONO%: 8.1 % (ref 2–12)
MONO%: 8.3 % (ref 2–12)
MONO%: 8.3 % (ref 2–12)
MONO%: 9.2 % (ref 2–12)
MONO: 0.7 10^3/UL (ref 0.2–1)
MONO: 0.8 10^3/UL (ref 0.2–1)
MPV: 8.5 FML (ref 8.6–11.7)
MPV: 8.6 FML (ref 8.6–11.7)
MPV: 8.7 FML (ref 8.6–11.7)
MPV: 8.8 FML (ref 8.6–11.7)
MPV: 8.9 FML (ref 8.6–11.7)
MPV: 9.1 FML (ref 8.6–11.7)
NEUT%: 64 % (ref 47–76)
NEUT%: 65.4 % (ref 47–76)
NEUT%: 65.4 % (ref 47–76)
NEUT%: 65.6 % (ref 47–76)
NEUT%: 67.7 % (ref 47–76)
NEUT%: 69.2 % (ref 47–76)
NEUT%: 70.7 % (ref 47–76)
NEUT%: 71.4 % (ref 47–76)
NEUT: 4.8 10^3/UL (ref 1.5–7.1)
NEUT: 5.3 10^3/UL (ref 1.5–7.1)
NEUT: 5.3 10^3/UL (ref 1.5–7.1)
NEUT: 5.8 10^3/UL (ref 1.5–7.1)
NEUT: 5.8 10^3/UL (ref 1.5–7.1)
NEUT: 6.5 10^3/UL (ref 1.5–7.1)
NEUT: 6.6 10^3/UL (ref 1.5–7.1)
NEUT: 7.2 10^3/UL (ref 1.5–7.1)
PLT: 145 10^3/UL (ref 150–375)
PLT: 149 10^3/UL (ref 150–375)
PLT: 162 10^3/UL (ref 150–375)
PLT: 162 10^3/UL (ref 150–375)
PLT: 164 10^3/UL (ref 150–375)
PLT: 169 10^3/UL (ref 150–375)
PLT: 174 10^3/UL (ref 150–375)
PLT: 175 10^3/UL (ref 150–375)
POTASSIUM: 3.7 MMOL/L (ref 3.5–5.3)
POTASSIUM: 3.9 MMOL/L
POTASSIUM: 4.1 MMOL/L (ref 3.5–5.3)
PROTEIN, TOTAL: 6.5 G/DL
PROTEIN, TOTAL: 6.9 G/DL (ref 6.1–8.1)
PROTEIN, TOTAL: 7 G/DL (ref 6.1–8.1)
RBC: 4.08 10^6/UL (ref 4.2–6.2)
RBC: 4.11 10^6/UL (ref 4.2–6.2)
RBC: 4.15 10^6/UL (ref 4.2–6.2)
RBC: 4.16 10^6/UL (ref 4.2–6.2)
RBC: 4.19 10^6/UL (ref 4.2–6.2)
RBC: 4.21 10^6/UL (ref 4.2–6.2)
RBC: 4.36 10^6/UL (ref 4.2–6.2)
RBC: 4.37 10^6/UL (ref 4.2–6.2)
RDW-CV: 12.9 %
RDW-CV: 13 %
RDW-CV: 13.1 %
RDW-CV: 13.1 %
RDW-CV: 13.2 %
RDW-CV: 13.3 %
RDW-SD: 44.1 FML (ref 36–50)
RDW-SD: 44.7 FML (ref 36–50)
RDW-SD: 45.3 FML (ref 36–50)
RDW-SD: 45.7 FML (ref 36–50)
RDW-SD: 45.8 FML (ref 36–50)
RDW-SD: 46 FML (ref 36–50)
RDW-SD: 46 FML (ref 36–50)
RDW-SD: 46.4 FML (ref 36–50)
SODIUM: 139 MMOL/L (ref 135–146)
SODIUM: 139 MMOL/L (ref 135–146)
SODIUM: 144 MMOL/L
UREA NITROGEN (BUN): 12 MG/DL (ref 7–25)
UREA NITROGEN (BUN): 14 MG/DL (ref 7–25)
UREA NITROGEN (BUN): 9 MG/DL
WBC: 10.2 10^3/UL (ref 4.3–11)
WBC: 7.5 10^3/UL (ref 4.3–11)
WBC: 8.1 10^3/UL (ref 4.3–11)
WBC: 8.2 10^3/UL (ref 4.3–11)
WBC: 8.2 10^3/UL (ref 4.3–11)
WBC: 8.9 10^3/UL (ref 4.3–11)
WBC: 9.4 10^3/UL (ref 4.3–11)
WBC: 9.8 10^3/UL (ref 4.3–11)

## 2020-10-11 VITALS
DIASTOLIC BLOOD PRESSURE: 94 MMHG | HEIGHT: 71 IN | HEART RATE: 73 BPM | SYSTOLIC BLOOD PRESSURE: 172 MMHG | BODY MASS INDEX: 44.1 KG/M2 | WEIGHT: 315 LBS

## 2020-10-11 VITALS — WEIGHT: 315 LBS | DIASTOLIC BLOOD PRESSURE: 86 MMHG | SYSTOLIC BLOOD PRESSURE: 158 MMHG

## 2020-10-11 VITALS
WEIGHT: 315 LBS | BODY MASS INDEX: 44.1 KG/M2 | SYSTOLIC BLOOD PRESSURE: 154 MMHG | DIASTOLIC BLOOD PRESSURE: 96 MMHG | HEIGHT: 71 IN

## 2020-10-11 VITALS — DIASTOLIC BLOOD PRESSURE: 104 MMHG | SYSTOLIC BLOOD PRESSURE: 148 MMHG | WEIGHT: 315 LBS

## 2020-10-11 VITALS — WEIGHT: 314 LBS | SYSTOLIC BLOOD PRESSURE: 153 MMHG | DIASTOLIC BLOOD PRESSURE: 90 MMHG

## 2020-10-11 VITALS — SYSTOLIC BLOOD PRESSURE: 146 MMHG | WEIGHT: 315 LBS | DIASTOLIC BLOOD PRESSURE: 72 MMHG

## 2020-10-11 VITALS
WEIGHT: 315 LBS | DIASTOLIC BLOOD PRESSURE: 108 MMHG | HEIGHT: 71 IN | SYSTOLIC BLOOD PRESSURE: 168 MMHG | BODY MASS INDEX: 44.1 KG/M2

## 2020-10-11 VITALS — DIASTOLIC BLOOD PRESSURE: 76 MMHG | SYSTOLIC BLOOD PRESSURE: 146 MMHG | WEIGHT: 315 LBS

## 2021-03-12 DIAGNOSIS — Z23 NEED FOR VACCINATION: ICD-10-CM

## 2025-06-06 NOTE — PAYOR COMM NOTE
Attending Physician: No att. providers found       (MR # [id number])         Order    Admit to inpatient Once [ADT1] (Order [id number])         Order Questions      Question Answer Comment     Diagnosis Community acquired pneumonia      Level of Care Telemet Bed/Stretcher in lowest position, wheels locked, appropriate side rails in place/Call bell, personal items and telephone in reach/Instruct patient to call for assistance before getting out of bed/chair/stretcher/Non-slip footwear applied when patient is off stretcher/[place] to call system/Physically safe environment - no spills, clutter or unnecessary equipment/Purposeful proactive rounding/Room/bathroom lighting operational, light cord in reach

## (undated) DEVICE — 3M™ RED DOT™ MONITORING ELECTRODE WITH FOAM TAPE AND STICKY GEL, 50/BAG, 20/CASE, 72/PLT 2570: Brand: RED DOT™

## (undated) DEVICE — ENDOSCOPY PACK - LOWER: Brand: MEDLINE INDUSTRIES, INC.

## (undated) DEVICE — Device: Brand: DEFENDO AIR/WATER/SUCTION AND BIOPSY VALVE

## (undated) DEVICE — 1200CC GUARDIAN II: Brand: GUARDIAN

## (undated) DEVICE — FILTERLINE NASAL ADULT O2/CO2

## (undated) NOTE — IP AVS SNAPSHOT
[facility] [place]  [address] [place], [address] ~ [phone]                Discharge Summary   3/1/2017    [address] [place] [address],[facility] Floor           Admission Information        Provider Department    3/1/2017 Pretty Lucero MD  3ne-A Take 3 mL (2.5 mg total) by nebulization every 6 (six) hours as needed for Wheezing.     Praveen MILLER                           predniSONE 20 MG Tabs   Last time this was given:  40 mg on 3/2/2017 10:39 AM   Commonly known as:  Wilmer Watson   What c vitamin C 100 MG Tabs   Next dose due:  3/3 AM        Take 100 mg by mouth daily.                               STOP taking these medications     azithromycin 250 MG Tabs   Commonly known as:  ZITHROMAX Z-ROGER           DAILY MULTI Tabs 6.03 (03/01/17)  0.95 (03/01/17)  0.25 (03/01/17)  0.01 (03/01/17)  0.13      Metabolic Lab Results  (Last result in the past 90 days)    HgbA1C Glucose BUN Creatinine Calcium Alkaline Phosph AST    -- (03/02/17)  165 (H) (03/02/17)  13 (03/02/17)  0.92 (0 visit,  view other health information, and more. To sign up or find more information, go to [email or web address] and click on the Sign Up Now link in the [employer] box.      Enter your [employer] Activation Code exactly as it appears below along with yo Most common side effects: Pain, fever, rash, fatigue, joint pain, blood clots, high blood pressure   What to report to your healthcare team: Pain, swelling, rash, fever           Non-Narcotic Pain Medications     allopurinol 300 MG Oral Tab       Use: Joelle ergocalciferol 05244 UNITS Oral Cap    Ascorbic Acid (VITAMIN C) 100 MG Oral Tab

## (undated) NOTE — MR AVS SNAPSHOT
[facility]  [address] [facility]  [address] [facility] [place] [id number] [phone]               Thank you for choosing us for your health care visit with PALMER Nicole. We are glad to serve you and happy to provide you with this summary of your visit. wheezing. Smoking makes the problem worse. Making a diagnosis  A physical exam, health history, and certain tests help your healthcare provider make the diagnosis. Health history  Your healthcare provider will ask you about your symptoms.   The exam  You · Take them as directed. For instance, some medicines should be taken with food. · Ask your provider or pharmacist what side effects are common, and what to do about them. Follow-up care  You should see your provider again in 2 to 3 weeks.  By this time, What changed: You were already taking a medication with the same name, and this prescription was added. Make sure you understand how and when to take each. Commonly known as:  VENTOLIN           allopurinol 300 MG Tabs   Take 300 mg by mouth daily.    [employer] will allow you to access patient instructions from your recent visit,  view other health information, and more. To sign up or find more information, go to [email or web address] and click on the Sign Up Now link in the [employer] box.      Enter 2 ½ hours per week – spread out over time Use a elmer to keep you motivated   Don’t forget strength training with weights and resistance Set goals and track your progress   You don’t need to join a gym. Home exercises work great.  Put more priority on exe